# Patient Record
Sex: FEMALE | URBAN - METROPOLITAN AREA
[De-identification: names, ages, dates, MRNs, and addresses within clinical notes are randomized per-mention and may not be internally consistent; named-entity substitution may affect disease eponyms.]

---

## 2024-06-03 ENCOUNTER — CLAIMS CREATED FROM THE CLAIM WINDOW (OUTPATIENT)
Dept: URBAN - METROPOLITAN AREA MEDICAL CENTER 27 | Facility: MEDICAL CENTER | Age: 39
End: 2024-06-03
Payer: SELF-PAY

## 2024-06-03 DIAGNOSIS — R74.01 ABNORMAL/ELEVATED TRANSAMINASE (SGOT, AMINOTRANSFERASE): ICD-10-CM

## 2024-06-03 DIAGNOSIS — D62 ABLA (ACUTE BLOOD LOSS ANEMIA): ICD-10-CM

## 2024-06-03 DIAGNOSIS — K92.1 ACUTE MELENA: ICD-10-CM

## 2024-06-03 DIAGNOSIS — R79.89 ABNORMAL BILIRUBIN TEST: ICD-10-CM

## 2024-06-03 PROCEDURE — 99255 IP/OBS CONSLTJ NEW/EST HI 80: CPT | Performed by: INTERNAL MEDICINE

## 2024-06-04 ENCOUNTER — LAB OUTSIDE AN ENCOUNTER (OUTPATIENT)
Dept: URBAN - METROPOLITAN AREA CLINIC 23 | Facility: CLINIC | Age: 39
End: 2024-06-04

## 2024-06-04 ENCOUNTER — CLAIMS CREATED FROM THE CLAIM WINDOW (OUTPATIENT)
Dept: URBAN - METROPOLITAN AREA MEDICAL CENTER 27 | Facility: MEDICAL CENTER | Age: 39
End: 2024-06-04
Payer: SELF-PAY

## 2024-06-04 DIAGNOSIS — K76.6 CLINICALLY SIGNIFICANT PORTAL HYPERTENSION: ICD-10-CM

## 2024-06-04 DIAGNOSIS — K31.89 ACHYLIA: ICD-10-CM

## 2024-06-04 DIAGNOSIS — D62 ABLA (ACUTE BLOOD LOSS ANEMIA): ICD-10-CM

## 2024-06-04 DIAGNOSIS — K92.1 ACUTE MELENA: ICD-10-CM

## 2024-06-04 PROCEDURE — 43235 EGD DIAGNOSTIC BRUSH WASH: CPT | Performed by: INTERNAL MEDICINE

## 2024-06-05 ENCOUNTER — CLAIMS CREATED FROM THE CLAIM WINDOW (OUTPATIENT)
Dept: URBAN - METROPOLITAN AREA MEDICAL CENTER 27 | Facility: MEDICAL CENTER | Age: 39
End: 2024-06-05
Payer: SELF-PAY

## 2024-06-05 DIAGNOSIS — K83.8 ACQUIRED DILATION OF BILE DUCT: ICD-10-CM

## 2024-06-05 DIAGNOSIS — K70.10 ACUTE ALCOHOLIC HEPATITIS: ICD-10-CM

## 2024-06-05 DIAGNOSIS — K92.1 ACUTE MELENA: ICD-10-CM

## 2024-06-05 DIAGNOSIS — D62 ABLA (ACUTE BLOOD LOSS ANEMIA): ICD-10-CM

## 2024-06-05 PROCEDURE — 99233 SBSQ HOSP IP/OBS HIGH 50: CPT | Performed by: INTERNAL MEDICINE

## 2024-06-06 ENCOUNTER — CLAIMS CREATED FROM THE CLAIM WINDOW (OUTPATIENT)
Dept: URBAN - METROPOLITAN AREA MEDICAL CENTER 27 | Facility: MEDICAL CENTER | Age: 39
End: 2024-06-06
Payer: SELF-PAY

## 2024-06-06 DIAGNOSIS — D62 ABLA (ACUTE BLOOD LOSS ANEMIA): ICD-10-CM

## 2024-06-06 DIAGNOSIS — K70.10 ACUTE ALCOHOLIC HEPATITIS: ICD-10-CM

## 2024-06-06 DIAGNOSIS — R50.9 ACUTE FEBRILE ILLNESS: ICD-10-CM

## 2024-06-06 DIAGNOSIS — K92.1 ACUTE MELENA: ICD-10-CM

## 2024-06-06 PROCEDURE — 99232 SBSQ HOSP IP/OBS MODERATE 35: CPT | Performed by: INTERNAL MEDICINE

## 2024-06-07 ENCOUNTER — CLAIMS CREATED FROM THE CLAIM WINDOW (OUTPATIENT)
Dept: URBAN - METROPOLITAN AREA MEDICAL CENTER 27 | Facility: MEDICAL CENTER | Age: 39
End: 2024-06-07
Payer: SELF-PAY

## 2024-06-07 DIAGNOSIS — K92.1 ACUTE MELENA: ICD-10-CM

## 2024-06-07 DIAGNOSIS — D62 ABLA (ACUTE BLOOD LOSS ANEMIA): ICD-10-CM

## 2024-06-07 DIAGNOSIS — K76.6 CLINICALLY SIGNIFICANT PORTAL HYPERTENSION: ICD-10-CM

## 2024-06-07 DIAGNOSIS — K70.10 ACUTE ALCOHOLIC HEPATITIS: ICD-10-CM

## 2024-06-07 PROCEDURE — 99232 SBSQ HOSP IP/OBS MODERATE 35: CPT | Performed by: INTERNAL MEDICINE

## 2024-06-10 ENCOUNTER — CLAIMS CREATED FROM THE CLAIM WINDOW (OUTPATIENT)
Dept: URBAN - METROPOLITAN AREA MEDICAL CENTER 27 | Facility: MEDICAL CENTER | Age: 39
End: 2024-06-10
Payer: SELF-PAY

## 2024-06-10 DIAGNOSIS — D62 ABLA (ACUTE BLOOD LOSS ANEMIA): ICD-10-CM

## 2024-06-10 DIAGNOSIS — K70.30 ALC (ALCOHOLIC LIVER CIRRHOSIS): ICD-10-CM

## 2024-06-10 DIAGNOSIS — K92.1 ACUTE MELENA: ICD-10-CM

## 2024-06-10 DIAGNOSIS — R74.8 ABNORMAL ALKALINE PHOSPHATASE TEST: ICD-10-CM

## 2024-06-10 PROCEDURE — 99232 SBSQ HOSP IP/OBS MODERATE 35: CPT | Performed by: INTERNAL MEDICINE

## 2024-06-11 ENCOUNTER — CLAIMS CREATED FROM THE CLAIM WINDOW (OUTPATIENT)
Dept: URBAN - METROPOLITAN AREA MEDICAL CENTER 27 | Facility: MEDICAL CENTER | Age: 39
End: 2024-06-11
Payer: SELF-PAY

## 2024-06-11 DIAGNOSIS — D62 ABLA (ACUTE BLOOD LOSS ANEMIA): ICD-10-CM

## 2024-06-11 DIAGNOSIS — K70.10 ACUTE ALCOHOLIC HEPATITIS: ICD-10-CM

## 2024-06-11 DIAGNOSIS — R74.8 ABNORMAL ALKALINE PHOSPHATASE TEST: ICD-10-CM

## 2024-06-11 DIAGNOSIS — K92.1 ACUTE MELENA: ICD-10-CM

## 2024-06-11 PROCEDURE — 99232 SBSQ HOSP IP/OBS MODERATE 35: CPT | Performed by: INTERNAL MEDICINE

## 2024-06-12 ENCOUNTER — CLAIMS CREATED FROM THE CLAIM WINDOW (OUTPATIENT)
Dept: URBAN - METROPOLITAN AREA MEDICAL CENTER 27 | Facility: MEDICAL CENTER | Age: 39
End: 2024-06-12
Payer: SELF-PAY

## 2024-06-12 DIAGNOSIS — K92.1 ACUTE MELENA: ICD-10-CM

## 2024-06-12 DIAGNOSIS — K70.10 ACUTE ALCOHOLIC HEPATITIS: ICD-10-CM

## 2024-06-12 DIAGNOSIS — D62 ABLA (ACUTE BLOOD LOSS ANEMIA): ICD-10-CM

## 2024-06-12 DIAGNOSIS — R74.8 ABNORMAL ALKALINE PHOSPHATASE TEST: ICD-10-CM

## 2024-06-12 PROCEDURE — 99232 SBSQ HOSP IP/OBS MODERATE 35: CPT | Performed by: INTERNAL MEDICINE

## 2024-06-13 ENCOUNTER — CLAIMS CREATED FROM THE CLAIM WINDOW (OUTPATIENT)
Dept: URBAN - METROPOLITAN AREA MEDICAL CENTER 27 | Facility: MEDICAL CENTER | Age: 39
End: 2024-06-13
Payer: SELF-PAY

## 2024-06-13 DIAGNOSIS — D50.9 ANEMIA: ICD-10-CM

## 2024-06-13 DIAGNOSIS — D12.4 ADENOMA OF DESCENDING COLON: ICD-10-CM

## 2024-06-13 DIAGNOSIS — K92.2 ACUTE GASTROINTESTINAL BLEEDING: ICD-10-CM

## 2024-06-13 PROCEDURE — 45380 COLONOSCOPY AND BIOPSY: CPT | Performed by: INTERNAL MEDICINE

## 2024-06-13 PROCEDURE — 45385 COLONOSCOPY W/LESION REMOVAL: CPT | Performed by: INTERNAL MEDICINE

## 2024-06-14 ENCOUNTER — CLAIMS CREATED FROM THE CLAIM WINDOW (OUTPATIENT)
Dept: URBAN - METROPOLITAN AREA MEDICAL CENTER 27 | Facility: MEDICAL CENTER | Age: 39
End: 2024-06-14
Payer: SELF-PAY

## 2024-06-14 DIAGNOSIS — K92.2 ACUTE GASTROINTESTINAL BLEEDING: ICD-10-CM

## 2024-06-14 DIAGNOSIS — Z12.11 COLON CANCER SCREENING: ICD-10-CM

## 2024-06-14 PROCEDURE — 45378 DIAGNOSTIC COLONOSCOPY: CPT | Performed by: INTERNAL MEDICINE

## 2024-06-14 PROCEDURE — 45380 COLONOSCOPY AND BIOPSY: CPT | Performed by: INTERNAL MEDICINE

## 2024-06-14 PROCEDURE — 99233 SBSQ HOSP IP/OBS HIGH 50: CPT | Performed by: NURSE PRACTITIONER

## 2024-06-14 PROCEDURE — 992 APS NON BILLABLE: Performed by: NURSE PRACTITIONER

## 2024-06-15 ENCOUNTER — CLAIMS CREATED FROM THE CLAIM WINDOW (OUTPATIENT)
Dept: URBAN - METROPOLITAN AREA MEDICAL CENTER 27 | Facility: MEDICAL CENTER | Age: 39
End: 2024-06-15
Payer: SELF-PAY

## 2024-06-15 DIAGNOSIS — D62 ABLA (ACUTE BLOOD LOSS ANEMIA): ICD-10-CM

## 2024-06-15 DIAGNOSIS — K91.840 BLEEDING AS COMPLICATION OF PANCREATIC-BILIARY SPHINCTEROTOMY: ICD-10-CM

## 2024-06-15 DIAGNOSIS — K70.10 ACUTE ALCOHOLIC HEPATITIS: ICD-10-CM

## 2024-06-15 PROCEDURE — 99232 SBSQ HOSP IP/OBS MODERATE 35: CPT | Performed by: INTERNAL MEDICINE

## 2024-06-16 ENCOUNTER — CLAIMS CREATED FROM THE CLAIM WINDOW (OUTPATIENT)
Dept: URBAN - METROPOLITAN AREA MEDICAL CENTER 27 | Facility: MEDICAL CENTER | Age: 39
End: 2024-06-16
Payer: SELF-PAY

## 2024-06-16 DIAGNOSIS — D62 ABLA (ACUTE BLOOD LOSS ANEMIA): ICD-10-CM

## 2024-06-16 DIAGNOSIS — K92.1 ACUTE MELENA: ICD-10-CM

## 2024-06-16 DIAGNOSIS — K70.10 ACUTE ALCOHOLIC HEPATITIS: ICD-10-CM

## 2024-06-16 PROCEDURE — 99232 SBSQ HOSP IP/OBS MODERATE 35: CPT | Performed by: INTERNAL MEDICINE

## 2024-06-17 ENCOUNTER — CLAIMS CREATED FROM THE CLAIM WINDOW (OUTPATIENT)
Dept: URBAN - METROPOLITAN AREA MEDICAL CENTER 27 | Facility: MEDICAL CENTER | Age: 39
End: 2024-06-17
Payer: SELF-PAY

## 2024-06-17 DIAGNOSIS — K70.10 ACUTE ALCOHOLIC HEPATITIS: ICD-10-CM

## 2024-06-17 DIAGNOSIS — K91.840 BLEEDING AS COMPLICATION OF PANCREATIC-BILIARY SPHINCTEROTOMY: ICD-10-CM

## 2024-06-17 DIAGNOSIS — D62 ABLA (ACUTE BLOOD LOSS ANEMIA): ICD-10-CM

## 2024-06-17 PROCEDURE — 99231 SBSQ HOSP IP/OBS SF/LOW 25: CPT | Performed by: INTERNAL MEDICINE

## 2024-06-18 ENCOUNTER — TELEPHONE ENCOUNTER (OUTPATIENT)
Dept: URBAN - METROPOLITAN AREA CLINIC 23 | Facility: CLINIC | Age: 39
End: 2024-06-18

## 2024-07-22 ENCOUNTER — OFFICE VISIT (OUTPATIENT)
Dept: URBAN - METROPOLITAN AREA CLINIC 23 | Facility: CLINIC | Age: 39
End: 2024-07-22
Payer: COMMERCIAL

## 2024-07-22 VITALS
SYSTOLIC BLOOD PRESSURE: 119 MMHG | HEIGHT: 67 IN | HEART RATE: 100 BPM | WEIGHT: 138 LBS | TEMPERATURE: 98.1 F | DIASTOLIC BLOOD PRESSURE: 69 MMHG | BODY MASS INDEX: 21.66 KG/M2

## 2024-07-22 DIAGNOSIS — Z98.890 STATUS POST COLONOSCOPY WITH POLYPECTOMY: ICD-10-CM

## 2024-07-22 DIAGNOSIS — K70.30 ALCOHOLIC CIRRHOSIS OF LIVER WITHOUT ASCITES: ICD-10-CM

## 2024-07-22 DIAGNOSIS — K76.82 HEPATIC ENCEPHALOPATHY: ICD-10-CM

## 2024-07-22 DIAGNOSIS — F10.90 ALCOHOL USE DISORDER: ICD-10-CM

## 2024-07-22 PROBLEM — 420054005: Status: ACTIVE | Noted: 2024-07-22

## 2024-07-22 PROCEDURE — 99214 OFFICE O/P EST MOD 30 MIN: CPT | Performed by: NURSE PRACTITIONER

## 2024-07-22 NOTE — EXAM-PHYSICAL EXAM
General--no acute distress, accompained by her significant other Eyes--+ icteric, clear conjunctiva HENT--normocephalic, atraumatic head, oropharynx clear Chest--clear to auscultation anteriorly, unlabored breathing, equal chest rise and fall Heart--regular rate and rhythm, no lower extremity edema Abdomen--soft, non tender, non distended, bowel sounds present Skin--no rashes, + jaundice, no pallor Neurologic--alert and appropriate

## 2024-07-22 NOTE — HPI-TODAY'S VISIT:
39 year old here for hospital follow up.  Patient was admitted June 2024 for seizure 2/2 alcohol withdrawal, new diagnosis of alcohol associated cirrhosis/ hepatitis, and severe anemia 2/2 bone marrow suppression from alcohol use. Hospitalization complicated by suspected acute post polypectomy bleed. Patient had a colonoscopy 6/13/2024 with removal of 12mm cecal polyp by hot snare. Repeat colonoscopy 6/14/2024 with colon full of blood. Also had an EGD with findings of severe portal hypertensive gastropathy.  Reports feeling tired with low energy since discharge. Had acute lower extremity edema requiring diuretics managed by Nephrology. Weaned off NaCl tablets. Currently on furosemide every other day but discontinuing after this week and using as needed per the Nephrologist.   No pruritus or increased abdominal girth.  Eating better. Weight stable.  Reports complete abstience from alcohol.  Obtained insurance via program for patient with alcohol and substance abuse. Able to afford all medications including rifaximin.   Reports some numbness and tingling to right thumb and intermittent discoloration to her lower extremities. No lower extremity pain. Also feels foggy. Needs refill on rifaximin. Last use 4 days prior. Has lactulose refills but has not filled it yet.

## 2024-07-23 ENCOUNTER — DASHBOARD ENCOUNTERS (OUTPATIENT)
Age: 39
End: 2024-07-23

## 2024-08-09 ENCOUNTER — TELEPHONE ENCOUNTER (OUTPATIENT)
Dept: URBAN - METROPOLITAN AREA CLINIC 5 | Facility: CLINIC | Age: 39
End: 2024-08-09

## 2024-08-20 ENCOUNTER — OFFICE VISIT (OUTPATIENT)
Dept: URBAN - METROPOLITAN AREA CLINIC 23 | Facility: CLINIC | Age: 39
End: 2024-08-20

## 2024-08-27 ENCOUNTER — OFFICE VISIT (OUTPATIENT)
Dept: URBAN - METROPOLITAN AREA CLINIC 23 | Facility: CLINIC | Age: 39
End: 2024-08-27

## 2024-09-03 ENCOUNTER — LAB OUTSIDE AN ENCOUNTER (OUTPATIENT)
Dept: URBAN - METROPOLITAN AREA CLINIC 23 | Facility: CLINIC | Age: 39
End: 2024-09-03

## 2024-09-24 ENCOUNTER — TELEPHONE ENCOUNTER (OUTPATIENT)
Dept: URBAN - METROPOLITAN AREA CLINIC 37 | Facility: CLINIC | Age: 39
End: 2024-09-24

## 2024-09-24 RX ORDER — LACTULOSE 10 G/15ML
15 MILLILITER SOLUTION ORAL ONCE A DAY
Qty: 1350 | Refills: 3 | OUTPATIENT
Start: 2024-09-25 | End: 2025-09-20

## 2024-09-25 ENCOUNTER — TELEPHONE ENCOUNTER (OUTPATIENT)
Dept: URBAN - METROPOLITAN AREA CLINIC 6 | Facility: CLINIC | Age: 39
End: 2024-09-25

## 2024-09-27 ENCOUNTER — OFFICE VISIT (OUTPATIENT)
Dept: URBAN - METROPOLITAN AREA CLINIC 23 | Facility: CLINIC | Age: 39
End: 2024-09-27

## 2024-10-01 ENCOUNTER — OFFICE VISIT (OUTPATIENT)
Dept: URBAN - METROPOLITAN AREA CLINIC 23 | Facility: CLINIC | Age: 39
End: 2024-10-01
Payer: COMMERCIAL

## 2024-10-01 VITALS
TEMPERATURE: 97.9 F | SYSTOLIC BLOOD PRESSURE: 118 MMHG | BODY MASS INDEX: 23.57 KG/M2 | WEIGHT: 150.2 LBS | HEART RATE: 77 BPM | HEIGHT: 67 IN | DIASTOLIC BLOOD PRESSURE: 68 MMHG

## 2024-10-01 DIAGNOSIS — K70.30 ALCOHOLIC CIRRHOSIS OF LIVER WITHOUT ASCITES: ICD-10-CM

## 2024-10-01 DIAGNOSIS — Z98.890 STATUS POST COLONOSCOPY WITH POLYPECTOMY: ICD-10-CM

## 2024-10-01 DIAGNOSIS — K76.82 HEPATIC ENCEPHALOPATHY: ICD-10-CM

## 2024-10-01 PROBLEM — 13920009: Status: ACTIVE | Noted: 2024-10-01

## 2024-10-01 PROCEDURE — 99213 OFFICE O/P EST LOW 20 MIN: CPT

## 2024-10-01 RX ORDER — LACTULOSE 10 G/15ML
30ML EVERY MORNING AND 15ML NIGHTLY SOLUTION ORAL ONCE A DAY
Qty: 1350 ML | Refills: 5 | OUTPATIENT
Start: 2024-10-01 | End: 2025-03-30

## 2024-10-01 RX ORDER — LACTULOSE 10 G/15ML
15 MILLILITER SOLUTION ORAL ONCE A DAY
Qty: 1350 | Refills: 3 | Status: ACTIVE | COMMUNITY
Start: 2024-09-25 | End: 2025-09-20

## 2024-10-01 NOTE — HPI-TODAY'S VISIT:
Last seen 7/2024 by Rubi CHAPPELL for alcoholic cirrhosis.     Pt admitted June 2024 for seizure 2/2 alcohol withdrawal, new dx of alcohol associated cirrhosis/hepatitis and severe anemia 2/2 bone marrow suppression from alcohol use. Hospitalization complicated by suspected acute post polypectomy bleed.  Patient had colonoscopy June 13 2024 with removal of 12 mm cecal polyp.  Repeat colonoscopy June 14 2024 with colon full of blood.  Also had a EGD with findings of severe portal hypertensive gastropathy. Previously on Lasix every other day. Diuretics managed by nephrology.  She states she has been doing better. Working with PCP on antidepressants, hoping to be off medication. Admits to drinking alcohol but is working on stopping.  She needs refill on Lactulose. takes 30mL in the morning and 15mL at night.  rifaximan too expensive and her insurance has not approved.  she states if she misses a dose of lactulose, she turns jaundice.  no longer on lasix. no increased abd girth or lower extremity edema.  1-2 BM daily has not had lab work or RUQ US. sees PCP in 2 weeks and will get lab work.

## 2025-02-18 ENCOUNTER — TELEPHONE ENCOUNTER (OUTPATIENT)
Dept: URBAN - METROPOLITAN AREA CLINIC 23 | Facility: CLINIC | Age: 40
End: 2025-02-18

## 2025-02-18 RX ORDER — LACTULOSE 10 G/15ML
30ML EVERY MORNING AND 15ML NIGHTLY SOLUTION ORAL ONCE A DAY
Qty: 1350 ML | Refills: 11
Start: 2024-10-01 | End: 2026-02-13

## 2025-03-31 ENCOUNTER — LAB OUTSIDE AN ENCOUNTER (OUTPATIENT)
Dept: URBAN - METROPOLITAN AREA CLINIC 23 | Facility: CLINIC | Age: 40
End: 2025-03-31

## 2025-03-31 ENCOUNTER — OFFICE VISIT (OUTPATIENT)
Dept: URBAN - METROPOLITAN AREA CLINIC 23 | Facility: CLINIC | Age: 40
End: 2025-03-31
Payer: COMMERCIAL

## 2025-03-31 DIAGNOSIS — K76.82 HEPATIC ENCEPHALOPATHY: ICD-10-CM

## 2025-03-31 DIAGNOSIS — K70.30 ALCOHOLIC CIRRHOSIS OF LIVER WITHOUT ASCITES: ICD-10-CM

## 2025-03-31 DIAGNOSIS — F10.90 ALCOHOL USE DISORDER: ICD-10-CM

## 2025-03-31 PROCEDURE — 99214 OFFICE O/P EST MOD 30 MIN: CPT

## 2025-03-31 RX ORDER — LACTULOSE 10 G/15ML
30ML EVERY MORNING AND 15ML NIGHTLY SOLUTION ORAL ONCE A DAY
Qty: 1350 ML | Refills: 11 | Status: ACTIVE | COMMUNITY
Start: 2024-10-01 | End: 2026-02-13

## 2025-03-31 RX ORDER — LACTULOSE 10 G/15ML
15 MILLILITER SOLUTION ORAL ONCE A DAY
Qty: 1350 | Refills: 3 | Status: ACTIVE | COMMUNITY
Start: 2024-09-25 | End: 2025-09-20

## 2025-03-31 NOTE — HPI-TODAY'S VISIT:
39-year-old female with alcoholic liver cirrhosis here for 6-month follow-up.  She was last seen in clinic October 2024 by me for alcoholic liver cirrhosis with hepatic encephalopathy.  She she admitted to continue alcohol use however she was working on quitting.  She was taking 30 mL lactulose in the morning and 15 mL at night.  Rifaximin too expensive and denied by insurance.  Labs pending with PCP.  Repeat colonoscopy due in 2029 given history of TA colon polyp.  She is in CBT therapy now and states that it is helping well.  She reports occasional slip ups and drinks alcohol.  She drinks bud light about once a month. Smokes 1/2 pack ID [place] lights per day  On lactulose 30mL in AM and 15mL in PM No HE, fatigue or dark urine.  2 soft BMs daily.

## 2025-05-13 ENCOUNTER — TELEPHONE ENCOUNTER (OUTPATIENT)
Dept: URBAN - METROPOLITAN AREA CLINIC 23 | Facility: CLINIC | Age: 40
End: 2025-05-13

## 2025-05-30 ENCOUNTER — TELEPHONE ENCOUNTER (OUTPATIENT)
Dept: URBAN - METROPOLITAN AREA CLINIC 23 | Facility: CLINIC | Age: 40
End: 2025-05-30